# Patient Record
Sex: FEMALE | Race: BLACK OR AFRICAN AMERICAN | URBAN - METROPOLITAN AREA
[De-identification: names, ages, dates, MRNs, and addresses within clinical notes are randomized per-mention and may not be internally consistent; named-entity substitution may affect disease eponyms.]

---

## 2017-03-11 ENCOUNTER — TELEPHONE (OUTPATIENT)
Dept: NURSING | Facility: CLINIC | Age: 67
End: 2017-03-11

## 2017-03-11 NOTE — TELEPHONE ENCOUNTER
"Call Type: Triage Call    Presenting Problem: \"I have not felt well since last Wed. I though it  was just a cold and cough. I tried taking OTC medication. I am still  sick, Now I have diarrhea(times one) and a fever 100.8(O). I also  have a headache, body aches, cough(sometoimes clear, or greenish)\"  Patient is taking fluids normally. urinating normally.  Denies other  sx. Triaged and advised home care and to be seen within 24 hrs.  Triage Note:  Guideline Title: Flu-Like Symptoms  Recommended Disposition: See Provider within 24 hours  Original Inclination: Wanted to speak with a nurse  Override Disposition:  Intended Action: Follow advice given  Physician Contacted: No  New productive cough with thick colored sputum (other than clear or white sputum;  not postnasal drainage) ?  YES  Signs of dehydration ? NO  Severe breathing problems ? NO  Breathing problems ? NO  Sudden change in mental status ? NO  Choking sensation, cannot swallow own saliva with associated drooling and soft  muffled voice ? NO  Temperature of 101.5 F (38.6 C) or greater that has not responded to 24 hours of  home care measures ? NO  New onset of stiff neck causing pain with forward head movement, severe  generalized headache, fever, or altered mental status ? NO  Any temperature elevation in an individual with a weakened immune system OR a  frail elderly person ? NO  Flu-like symptoms associated with a cough and breathing that is becoming more  difficult ? NO  Evaluated by provider AND symptoms worsening when following recommended treatment  plan for 48 hours ? NO  New OR worsening flu-like illness AND in high risk group for complications ? NO  In high risk group for complications of influenza AND has questions/concerns ? NO  Flu-like illness that has not improved after 7 days of home care ? NO  Gradual onset of upper respiratory illness signs and symptoms(If there is any  doubt that symptoms may be an upper respiratory illness, use this " guideline,  Flu-Like Symptoms ) ? NO  Being treated by a provider for a secondary infection AND no improvement in  symptoms, symptoms have worsened OR has new symptoms after following treatment  plan for the time specified by provider. ? NO  Severe headache not relieved with 8 hours of home care ? NO  Physician Instructions:  Care Advice: Use a cool mist humidifier to moisten air.  Be sure to clean  according to 's instructions.  Speak with provider immediately if breathing labored or noisy, pain with  breathing, worsening cough, fever improved but has returned  or inability to carry out ADLs.  Increase fluids to 8-12 eight oz (1.6 to 2.4 liters) glasses per day, half  of them to be water.  Soups, popsicles, fruit juices, non-caffeinated sodas  (unless restricting sodium intake), jello, broths, decaf teas, etc. are all  okay.  Warm fluids can be soothing.  CAUTIONS  If you can, stop smoking now and avoid all secondhand smoke.  SYMPTOM / CONDITION MANAGEMENT  Coughing up mucus or phlegm helps to get rid of an infection.  A productive  cough should not be stopped.  A cough medicine with guaifenesin  (Robitussin, Mucinex) can help loosen the mucus.  Cough medicine with  dextromethorphan (DM) should be avoided.  Drinking lots of fluids can help  loosen the mucus too, especially warm fluids.  Do not go to work, school, or any community activity until you have not had  a fever (100F or 37.8C) for at least 24 hours without taking fever-reducing  medication.  This means staying at home for at least 3 to 5, possibly 7  days.